# Patient Record
Sex: MALE | Race: BLACK OR AFRICAN AMERICAN | NOT HISPANIC OR LATINO | URBAN - METROPOLITAN AREA
[De-identification: names, ages, dates, MRNs, and addresses within clinical notes are randomized per-mention and may not be internally consistent; named-entity substitution may affect disease eponyms.]

---

## 2020-07-09 ENCOUNTER — OFFICE VISIT (OUTPATIENT)
Dept: URBAN - METROPOLITAN AREA MEDICAL CENTER 28 | Facility: MEDICAL CENTER | Age: 30
End: 2020-07-09
Payer: COMMERCIAL

## 2020-07-09 DIAGNOSIS — R14.2 BELCHING: ICD-10-CM

## 2020-07-09 DIAGNOSIS — K21.9 ACID REFLUX: ICD-10-CM

## 2020-07-09 PROCEDURE — 91010 ESOPHAGUS MOTILITY STUDY: CPT | Performed by: INTERNAL MEDICINE

## 2020-07-09 PROCEDURE — 91038 ESOPH IMPED FUNCT TEST > 1HR: CPT | Performed by: INTERNAL MEDICINE

## 2020-08-04 ENCOUNTER — OFFICE VISIT (OUTPATIENT)
Dept: URBAN - METROPOLITAN AREA CLINIC 25 | Facility: CLINIC | Age: 30
End: 2020-08-04
Payer: COMMERCIAL

## 2020-08-04 ENCOUNTER — DASHBOARD ENCOUNTERS (OUTPATIENT)
Age: 30
End: 2020-08-04

## 2020-08-04 DIAGNOSIS — R14.2 BELCHING SYMPTOM: ICD-10-CM

## 2020-08-04 DIAGNOSIS — R10.13 DYSPEPSIA: ICD-10-CM

## 2020-08-04 DIAGNOSIS — K21.9 GERD: ICD-10-CM

## 2020-08-04 PROCEDURE — G9903 PT SCRN TBCO ID AS NON USER: HCPCS | Performed by: INTERNAL MEDICINE

## 2020-08-04 PROCEDURE — G8417 CALC BMI ABV UP PARAM F/U: HCPCS | Performed by: INTERNAL MEDICINE

## 2020-08-04 PROCEDURE — G8427 DOCREV CUR MEDS BY ELIG CLIN: HCPCS | Performed by: INTERNAL MEDICINE

## 2020-08-04 PROCEDURE — 99213 OFFICE O/P EST LOW 20 MIN: CPT | Performed by: INTERNAL MEDICINE

## 2020-08-04 PROCEDURE — 91065 BREATH HYDROGEN/METHANE TEST: CPT | Performed by: INTERNAL MEDICINE

## 2020-08-04 RX ORDER — PANTOPRAZOLE SODIUM 40 MG/1
TAKE 1 TABLET (40 MG) BY ORAL ROUTE ONCE DAILY TABLET, DELAYED RELEASE ORAL 1
Qty: 90 | Refills: 3 | Status: ACTIVE | COMMUNITY
Start: 2019-02-08

## 2020-08-04 RX ORDER — SUCRALFATE 1 G/1
TAKE 1 TABLET (1 GRAM) BY ORAL ROUTE 4 TIMES PER DAY ON AN EMPTY STOMACH 1 HOUR BEFORE MEALS AND AT BEDTIME TABLET ORAL
Qty: 120 | Refills: 2 | Status: ACTIVE | COMMUNITY
Start: 2019-04-09

## 2020-08-04 RX ORDER — FAMOTIDINE 40 MG/1
1 TABLET TABLET, FILM COATED ORAL TWICE A DAY
Qty: 60 | Refills: 5 | OUTPATIENT
Start: 2020-08-04

## 2020-08-04 NOTE — HPI-TODAY'S VISIT:
Patient last seen in 4/2020.  He had combined Eso Mano/Impedance testing with 24 hour pH monitor.  Eso mano was essentially normal.  Impedance testing did have increased acid/non-acid reflux.  He did not think that the Carafate was helpful.  He has a lot of belch.  He feels like he is "throwing up " into the back of his throat. He has heartburn every day. He has increased supine symptoms.  He denies N/V/dysphagia.  He denies anroexia or weight loss.  He denies early satiety.  He has abdominal pain that he thinks is from the gas.  He has increased bowel frequency lately.  He denies LGI bleed

## 2020-08-24 ENCOUNTER — OFFICE VISIT (OUTPATIENT)
Dept: URBAN - METROPOLITAN AREA CLINIC 83 | Facility: CLINIC | Age: 30
End: 2020-08-24
Payer: COMMERCIAL

## 2020-08-24 DIAGNOSIS — R14.2 BELCHING: ICD-10-CM

## 2020-08-24 PROCEDURE — 91065 BREATH HYDROGEN/METHANE TEST: CPT | Performed by: INTERNAL MEDICINE

## 2020-08-24 RX ORDER — SUCRALFATE 1 G/1
TAKE 1 TABLET (1 GRAM) BY ORAL ROUTE 4 TIMES PER DAY ON AN EMPTY STOMACH 1 HOUR BEFORE MEALS AND AT BEDTIME TABLET ORAL
Qty: 120 | Refills: 2 | Status: ACTIVE | COMMUNITY
Start: 2019-04-09

## 2020-08-24 RX ORDER — PANTOPRAZOLE SODIUM 40 MG/1
TAKE 1 TABLET (40 MG) BY ORAL ROUTE ONCE DAILY TABLET, DELAYED RELEASE ORAL 1
Qty: 90 | Refills: 3 | Status: ACTIVE | COMMUNITY
Start: 2019-02-08

## 2020-08-24 RX ORDER — FAMOTIDINE 40 MG/1
1 TABLET TABLET, FILM COATED ORAL TWICE A DAY
Qty: 60 | Refills: 5 | Status: ACTIVE | COMMUNITY
Start: 2020-08-04

## 2020-08-25 ENCOUNTER — TELEPHONE ENCOUNTER (OUTPATIENT)
Dept: URBAN - METROPOLITAN AREA CLINIC 92 | Facility: CLINIC | Age: 30
End: 2020-08-25

## 2020-08-25 RX ORDER — PANTOPRAZOLE SODIUM 40 MG/1
TAKE 1 TABLET (40 MG) BY ORAL ROUTE ONCE DAILY TABLET, DELAYED RELEASE ORAL 1
Qty: 90 | Refills: 3 | Status: ACTIVE | COMMUNITY
Start: 2019-02-08 | End: 1900-01-01

## 2020-08-25 RX ORDER — RIFAXIMIN 550 MG/1
1 TABLET TABLET ORAL THREE TIMES A DAY
Qty: 42 TABLET | Refills: 2 | OUTPATIENT
Start: 2020-08-25 | End: 2020-10-06

## 2020-08-25 RX ORDER — SUCRALFATE 1 G/1
TAKE 1 TABLET (1 GRAM) BY ORAL ROUTE 4 TIMES PER DAY ON AN EMPTY STOMACH 1 HOUR BEFORE MEALS AND AT BEDTIME TABLET ORAL
Qty: 120 | Refills: 2 | Status: ACTIVE | COMMUNITY
Start: 2019-04-09 | End: 1900-01-01

## 2020-10-30 ENCOUNTER — TELEPHONE ENCOUNTER (OUTPATIENT)
Dept: URBAN - METROPOLITAN AREA CLINIC 92 | Facility: CLINIC | Age: 30
End: 2020-10-30

## 2021-01-20 ENCOUNTER — TELEPHONE ENCOUNTER (OUTPATIENT)
Dept: URBAN - METROPOLITAN AREA CLINIC 92 | Facility: CLINIC | Age: 31
End: 2021-01-20

## 2021-01-20 RX ORDER — FAMOTIDINE 40 MG/1
1 TABLET TABLET, FILM COATED ORAL TWICE A DAY
Qty: 180
Start: 2020-08-04